# Patient Record
Sex: MALE | Race: WHITE | Employment: UNEMPLOYED | URBAN - METROPOLITAN AREA
[De-identification: names, ages, dates, MRNs, and addresses within clinical notes are randomized per-mention and may not be internally consistent; named-entity substitution may affect disease eponyms.]

---

## 2021-02-22 ENCOUNTER — HOSPITAL ENCOUNTER (EMERGENCY)
Age: 36
Discharge: HOME | End: 2021-02-22
Attending: EMERGENCY MEDICINE

## 2021-02-22 VITALS
WEIGHT: 220 LBS | TEMPERATURE: 98.4 F | BODY MASS INDEX: 29.8 KG/M2 | OXYGEN SATURATION: 99 % | DIASTOLIC BLOOD PRESSURE: 92 MMHG | HEIGHT: 72 IN | RESPIRATION RATE: 16 BRPM | HEART RATE: 86 BPM | SYSTOLIC BLOOD PRESSURE: 139 MMHG

## 2021-02-22 DIAGNOSIS — L02.91 ABSCESS: Primary | ICD-10-CM

## 2021-02-22 PROCEDURE — 10061 I&D ABSCESS COMP/MULTIPLE: CPT

## 2021-02-22 PROCEDURE — 99283 EMERGENCY DEPT VISIT LOW MDM: CPT

## 2021-02-22 PROCEDURE — 2500000003 HC RX 250 WO HCPCS: Performed by: EMERGENCY MEDICINE

## 2021-02-22 RX ORDER — LIDOCAINE HYDROCHLORIDE 10 MG/ML
5 INJECTION, SOLUTION INFILTRATION; PERINEURAL ONCE
Status: COMPLETED | OUTPATIENT
Start: 2021-02-22 | End: 2021-02-22

## 2021-02-22 RX ADMIN — LIDOCAINE HYDROCHLORIDE 5 ML: 10 INJECTION, SOLUTION INFILTRATION; PERINEURAL at 05:49

## 2021-02-22 SDOH — HEALTH STABILITY: MENTAL HEALTH: HOW OFTEN DO YOU HAVE A DRINK CONTAINING ALCOHOL?: NEVER

## 2021-02-22 ASSESSMENT — ENCOUNTER SYMPTOMS
SHORTNESS OF BREATH: 0
COUGH: 0
DIARRHEA: 0
ABDOMINAL PAIN: 0
BACK PAIN: 0
VOMITING: 0

## 2021-02-22 NOTE — ED TRIAGE NOTES
Mode of arrival (squad #, walk in, police, etc) : car         Chief complaint(s): abscess     Arrival Note (brief scenario, treatment PTA, etc). : abscess behind left ear behind mask loop redden swollen           C= \"Have you ever felt that you should Cut down on your drinking? \"  No  A= \"Have people Annoyed you by criticizing your drinking? \"  No  G= \"Have you ever felt bad or Guilty about your drinking? \"  No  E= \"Have you ever had a drink as an Eye-opener first thing in the morning to steady your nerves or to help a hangover? \"  No      Deferred []      Reason for deferring: N/A    *If yes to two or more: probable alcohol abuse. *

## 2021-02-22 NOTE — ED PROVIDER NOTES
EMERGENCY DEPARTMENT ENCOUNTER    Pt Name: Franny Floyd  MRN: [id number]  [place] 1985  Date of evaluation: 2/22/21  CHIEF COMPLAINT       Chief Complaint   Patient presents with    Abscess     HISTORY OF PRESENT ILLNESS   HPI     This is a 59-year-old male who comes in today. About a week ago the patient noticed irritation behind his left ear states that it is causing inner ear problems now. He denies any fevers chills cough congestion no history of similar symptoms. It is during the coronavirus pandemic and he does wear a mask with the loops that go behind his ears. He took ibuprofen earlier tonight without much improvement of his symptoms. The patient states that he has a history of MRSA in his arm but that was years ago. He denies any other complaints at this time. REVIEW OF SYSTEMS     Review of Systems   Constitutional: Negative for fever. HENT: Positive for ear pain. Negative for congestion. Respiratory: Negative for cough and shortness of breath. Cardiovascular: Negative for chest pain. Gastrointestinal: Negative for abdominal pain, diarrhea and vomiting. Genitourinary: Negative for dysuria. Musculoskeletal: Negative for back pain. Skin: Negative for rash. Neurological: Negative for headaches. All other systems reviewed and are negative. PASTMEDICAL HISTORY   History reviewed. No pertinent past medical history. SURGICAL HISTORY     History reviewed. No pertinent surgical history. CURRENT MEDICATIONS       Previous Medications    No medications on file     ALLERGIES     has No Known Allergies. FAMILY HISTORY     has no family status information on file.       SOCIAL HISTORY       Social History     Tobacco Use    Smoking status: Current Every Day Smoker     Packs/day: 0.50     Types: Cigarettes    Smokeless tobacco: Never Used   Substance Use Topics    Alcohol use: Never     Frequency: Never    Drug use: Never     PHYSICAL EXAM INITIAL VITALS: Pulse 86   Resp 16   Ht 6' (1.829 m)   Wt 220 lb (99.8 kg)   SpO2 99%   BMI 29.84 kg/m²    Physical Exam  Vitals signs and nursing note reviewed. Constitutional:       General: He is not in acute distress. Appearance: He is well-developed. HENT:      Head: Normocephalic and atraumatic. Right Ear: Tympanic membrane normal.      Left Ear: Tympanic membrane normal.      Ears:      Comments: Just posterior to the mid left external ear is a 0.5 cm area of fluctuance no surrounding erythema mildly tender to palpation no tenderness to palpation over the mastoid process normal TMs bilaterally  Eyes:      Conjunctiva/sclera: Conjunctivae normal.   Neck:      Musculoskeletal: Neck supple. Cardiovascular:      Rate and Rhythm: Normal rate and regular rhythm. Pulmonary:      Effort: No respiratory distress. Skin:     General: Skin is warm and dry. Capillary Refill: Capillary refill takes less than 2 seconds. Neurological:      Mental Status: He is alert. EMERGENCY DEPARTMENTCOURSE:     Differential diagnosis includes otitis media otitis externa mastoiditis abscess. The patient has a mask where there is an  that can change the length of the ear loops and that  is directly overlying this area of fluctuance I am concerned that this caused a small abscess. Given the location and how close it is to the mastoid process and I&D was performed he has no tenderness over the mastoid process and no fever doubt mastoiditis at this time a small amount of purulence was expressed patient states that he already is feeling better do not believe that he requires antibiotics at this time there is no surrounding erythema no swelling of the external ear or mastoid process.   Patient will be discharged with return instructions             PROCEDURES:    Incision/Drainage    Date/Time: 2/22/2021 6:06 AM  Performed by: Cachroro Rdz MD

## 2023-03-14 ENCOUNTER — OFFICE VISIT (OUTPATIENT)
Dept: PRIMARY CARE | Facility: CLINIC | Age: 38
End: 2023-03-14
Payer: MEDICAID

## 2023-03-14 VITALS
OXYGEN SATURATION: 95 % | WEIGHT: 272 LBS | BODY MASS INDEX: 38.08 KG/M2 | DIASTOLIC BLOOD PRESSURE: 96 MMHG | HEIGHT: 71 IN | SYSTOLIC BLOOD PRESSURE: 134 MMHG | HEART RATE: 109 BPM

## 2023-03-14 DIAGNOSIS — F32.A ANXIETY AND DEPRESSION: Primary | ICD-10-CM

## 2023-03-14 DIAGNOSIS — F51.04 PSYCHOPHYSIOLOGICAL INSOMNIA: ICD-10-CM

## 2023-03-14 DIAGNOSIS — F41.9 ANXIETY AND DEPRESSION: Primary | ICD-10-CM

## 2023-03-14 DIAGNOSIS — R25.1 TREMOR: ICD-10-CM

## 2023-03-14 PROCEDURE — 99204 OFFICE O/P NEW MOD 45 MIN: CPT | Performed by: PHYSICIAN ASSISTANT

## 2023-03-14 PROCEDURE — 4004F PT TOBACCO SCREEN RCVD TLK: CPT | Performed by: PHYSICIAN ASSISTANT

## 2023-03-14 RX ORDER — TRAZODONE HYDROCHLORIDE 50 MG/1
TABLET ORAL
Qty: 30 TABLET | Refills: 5 | Status: SHIPPED | OUTPATIENT
Start: 2023-03-14 | End: 2023-06-27 | Stop reason: ALTCHOICE

## 2023-03-14 RX ORDER — SERTRALINE HYDROCHLORIDE 25 MG/1
25 TABLET, FILM COATED ORAL DAILY
Qty: 30 TABLET | Refills: 5 | Status: SHIPPED | OUTPATIENT
Start: 2023-03-14 | End: 2023-06-27 | Stop reason: ALTCHOICE

## 2023-03-14 ASSESSMENT — PATIENT HEALTH QUESTIONNAIRE - PHQ9
2. FEELING DOWN, DEPRESSED OR HOPELESS: NOT AT ALL
SUM OF ALL RESPONSES TO PHQ9 QUESTIONS 1 AND 2: 0
1. LITTLE INTEREST OR PLEASURE IN DOING THINGS: NOT AT ALL

## 2023-03-14 NOTE — PROGRESS NOTES
"Subjective   Patient ID: Link Emanuel is a 37 y.o. male who presents for [facility] and Tremors (Internal tremors. Worse at HS. Worse when he lays down. ).  HPI    Patient presents to [facility].    Patient has no chronic illnesses and takes no daily medicines.    Patient presents to discuss possible tremors.  Patient reports intermittent tremors that wax and wane without known precipitating event.  Patient states they are most noticeable at night and feel like \"internal tremors\" that can sometimes be felt by the patient's partner.  Patient denies tremor that affects ability to grasp or affects fine motor movement.  Patient reports intermittent associated occipital headaches and paresthesias in the right upper extremity as well.  Patient was seen in the ER for this not long ago and CT brain was unremarkable.  Basic labs were also unremarkable.  Patient states overall, this has been going on for at least a couple months.    With further discussion, the patient does admit to fairly profound anxiety.  Patient states this is most pronounced in social situations even forcing the patient to, for example, leave the store in and near panic attack.  Patient also admits to symptoms consistent with depression including lack of nicholas, lack of interest, general fatigue and apathy, and states both of these have been present for decades.  No prior evaluation or treatment for this.  There is associated insomnia.  Patient has difficulty both falling and staying asleep.    Review of Systems    Review of Systems   Constitutional:  See HPI   Eye:  No recent visual problem.    Respiratory:  No shortness of breath, No cough.    Cardiovascular:  No chest pain.    Gastrointestinal:  No abdominal pain, No nausea, No vomiting.    Genitourinary:  No dysuria, No hematuria.    Musculoskeletal:  No decreased range of motion.    Integumentary:  No rash.    Neurologic: See HPI   Psychiatric: See HPI  all other systems are negative " "    Objective     BP (!) 134/96   Pulse 109   Ht 1.803 m (5' 11\")   Wt 123 kg (272 lb)   SpO2 95%   BMI 37.94 kg/m²     Physical Exam    General:  Alert and oriented, No acute distress.    Eye:  Pupils are equal, round and reactive to light, Extraocular movements are intact, Normal conjunctiva.    HENT:  Normocephalic, Normal hearing, Oral mucosa is moist, No pharyngeal erythema, No sinus tenderness.    Neck:  Supple, Non-tender, No lymphadenopathy.    Respiratory:  Lungs are clear to auscultation, Respirations are non-labored, Breath sounds are equal    Cardiovascular:  Normal rate, Regular rhythm.    Gastrointestinal:  Non-distended.    Musculoskeletal: Normal range of motion, Normal strength, No tenderness, No swelling, No deformity, Normal gait.     Integumentary:  Warm, Dry, Intact, No pallor, No rash.    Neurologic:  Alert, Oriented, Normal sensory, Normal motor function, No focal deficits, Cranial Nerves II-XII are grossly intact   Psychiatric:  Cooperative, Appropriate mood & affect.      Assessment/Plan   Anxiety and depression: Treatment options reviewed.  Start sertraline 25 mg daily.  Follow-up 1 month    Insomnia: Start trazodone 50 mg tab.  One half or 1 tab nightly as needed insomnia    Tremors: None appreciated on exam today.  TSH, A1c, lipids, vitamins D and B, and magnesium labs ordered.  Further recommendations pending results.  Neurology referral ordered  Problem List Items Addressed This Visit    None  Visit Diagnoses       Anxiety and depression    -  Primary    Relevant Medications    sertraline (Zoloft) 25 mg tablet    Psychophysiological insomnia        Relevant Medications    traZODone (Desyrel) 50 mg tablet    Tremor        Relevant Orders    TSH with reflex to Free T4 if abnormal    Hemoglobin A1C    Lipid Panel    Magnesium    Vitamin B12    Vitamin D 1,25 Dihydroxy    Referral to Neurology            Final diagnoses:   [F41.9, F32.9] Anxiety and depression   [F51.04] " Psychophysiological insomnia   [R25.1] Tremor

## 2023-03-14 NOTE — PROGRESS NOTES
Subjective   Patient ID: Link Emanuel is a 37 y.o. male who presents for Establish Care and Tremors (Internal tremors. Worse at HS. Worse when he lays down. ).    HPI     Review of Systems    Objective   There were no vitals taken for this visit.    Physical Exam    Assessment/Plan

## 2023-04-15 ENCOUNTER — LAB (OUTPATIENT)
Dept: LAB | Facility: LAB | Age: 38
End: 2023-04-15
Payer: MEDICAID

## 2023-04-15 DIAGNOSIS — R25.1 TREMOR: ICD-10-CM

## 2023-04-15 LAB
CHOLESTEROL (MG/DL) IN SER/PLAS: 180 MG/DL (ref 0–199)
CHOLESTEROL IN HDL (MG/DL) IN SER/PLAS: 40 MG/DL
CHOLESTEROL/HDL RATIO: 4.5
COBALAMIN (VITAMIN B12) (PG/ML) IN SER/PLAS: 526 PG/ML (ref 211–911)
ESTIMATED AVERAGE GLUCOSE FOR HBA1C: 97 MG/DL
HEMOGLOBIN A1C/HEMOGLOBIN TOTAL IN BLOOD: 5 %
LDL: 117 MG/DL (ref 0–99)
MAGNESIUM (MG/DL) IN SER/PLAS: 2.18 MG/DL (ref 1.6–2.4)
THYROTROPIN (MIU/L) IN SER/PLAS BY DETECTION LIMIT <= 0.05 MIU/L: 1.37 MIU/L (ref 0.44–3.98)
TRIGLYCERIDE (MG/DL) IN SER/PLAS: 114 MG/DL (ref 0–149)
VLDL: 23 MG/DL (ref 0–40)

## 2023-04-15 PROCEDURE — 82652 VIT D 1 25-DIHYDROXY: CPT

## 2023-04-15 PROCEDURE — 84443 ASSAY THYROID STIM HORMONE: CPT

## 2023-04-15 PROCEDURE — 83036 HEMOGLOBIN GLYCOSYLATED A1C: CPT

## 2023-04-15 PROCEDURE — 36415 COLL VENOUS BLD VENIPUNCTURE: CPT

## 2023-04-15 PROCEDURE — 82607 VITAMIN B-12: CPT

## 2023-04-15 PROCEDURE — 80061 LIPID PANEL: CPT

## 2023-04-15 PROCEDURE — 83735 ASSAY OF MAGNESIUM: CPT

## 2023-04-17 ENCOUNTER — APPOINTMENT (OUTPATIENT)
Dept: PRIMARY CARE | Facility: CLINIC | Age: 38
End: 2023-04-17

## 2023-04-21 LAB — VITAMIN D 1,25-DIHYDROXY: 25.9 PG/ML (ref 19.9–79.3)

## 2023-06-27 ENCOUNTER — OFFICE VISIT (OUTPATIENT)
Dept: PRIMARY CARE | Facility: CLINIC | Age: 38
End: 2023-06-27
Payer: COMMERCIAL

## 2023-06-27 VITALS
BODY MASS INDEX: 35.49 KG/M2 | WEIGHT: 262 LBS | HEART RATE: 108 BPM | HEIGHT: 72 IN | DIASTOLIC BLOOD PRESSURE: 98 MMHG | SYSTOLIC BLOOD PRESSURE: 136 MMHG | OXYGEN SATURATION: 94 %

## 2023-06-27 DIAGNOSIS — F51.01 PRIMARY INSOMNIA: ICD-10-CM

## 2023-06-27 DIAGNOSIS — F41.1 GENERALIZED ANXIETY DISORDER: ICD-10-CM

## 2023-06-27 DIAGNOSIS — F51.01 PRIMARY INSOMNIA: Primary | ICD-10-CM

## 2023-06-27 PROBLEM — G47.09 OTHER INSOMNIA: Status: ACTIVE | Noted: 2023-06-27

## 2023-06-27 PROCEDURE — 4004F PT TOBACCO SCREEN RCVD TLK: CPT | Performed by: FAMILY MEDICINE

## 2023-06-27 PROCEDURE — 99214 OFFICE O/P EST MOD 30 MIN: CPT | Performed by: FAMILY MEDICINE

## 2023-06-27 RX ORDER — QUETIAPINE FUMARATE 50 MG/1
50 TABLET, EXTENDED RELEASE ORAL NIGHTLY
Qty: 30 TABLET | Refills: 1 | Status: SHIPPED | OUTPATIENT
Start: 2023-06-27 | End: 2023-06-27 | Stop reason: SDUPTHER

## 2023-06-27 RX ORDER — QUETIAPINE FUMARATE 50 MG/1
50 TABLET, EXTENDED RELEASE ORAL NIGHTLY
Qty: 30 TABLET | Refills: 1 | Status: SHIPPED | OUTPATIENT
Start: 2023-06-27 | End: 2023-07-12 | Stop reason: SDUPTHER

## 2023-06-27 ASSESSMENT — ENCOUNTER SYMPTOMS
DYSPHORIC MOOD: 1
NERVOUS/ANXIOUS: 1
PALPITATIONS: 1
SLEEP DISTURBANCE: 1
CHEST TIGHTNESS: 0
DECREASED CONCENTRATION: 0
HYPERACTIVE: 0
AGITATION: 1
CONFUSION: 0
DIARRHEA: 0
COUGH: 0
SHORTNESS OF BREATH: 0
HEADACHES: 0
CONSTIPATION: 0
ABDOMINAL PAIN: 0
FATIGUE: 1

## 2023-06-27 ASSESSMENT — PATIENT HEALTH QUESTIONNAIRE - PHQ9
SUM OF ALL RESPONSES TO PHQ9 QUESTIONS 1 AND 2: 0
1. LITTLE INTEREST OR PLEASURE IN DOING THINGS: NOT AT ALL
2. FEELING DOWN, DEPRESSED OR HOPELESS: NOT AT ALL

## 2023-06-27 NOTE — PROGRESS NOTES
Subjective   Patient ID: Link Emanuel is a 38 y.o. male who presents for Establish Care.    HPI   Sounds like he has some significant anxiety, but he also has racing thoughts at night trying to fall asleep and when he wakes in the night back to sleep racing thoughts.  His racing thoughts started just after high school.  Also does have some anxiety with occasional panic attack.  Those are not necessarily getting worse, but he does feel the anxiety is getting worse.  Sister possibly has bipolar.  He did try trazodone at night to help with sleep and even 2 pills did not help.  Zoloft caused a very bad reaction and only took a couple of pills.    I do think he has anxiety with panic attacks, but I think there is a possibility of bipolar disorder also.  Since he failed Zoloft, neck step usually is Prozac.  Lets try long-acting Seroquel.    Quetiapine ER 50 mg 1 or 2 pills in the evening.  Call next week with an update on how he is doing.  No office visit set yet.    Review of Systems   Constitutional:  Positive for fatigue.   Eyes:  Negative for visual disturbance.   Respiratory:  Negative for cough, chest tightness and shortness of breath.    Cardiovascular:  Positive for chest pain and palpitations.   Gastrointestinal:  Negative for abdominal pain, constipation and diarrhea.   Skin:  Negative for rash.   Neurological:  Negative for headaches.   Psychiatric/Behavioral:  Positive for agitation, behavioral problems, dysphoric mood and sleep disturbance. Negative for confusion and decreased concentration. The patient is nervous/anxious. The patient is not hyperactive.        Objective   BP (!) 136/98   Pulse 108   Ht 1.829 m (6')   Wt 119 kg (262 lb)   SpO2 94%   BMI 35.53 kg/m²     Physical Exam  Constitutional:       Appearance: Normal appearance.   HENT:      Head: Normocephalic and atraumatic.   Neck:      Thyroid: No thyromegaly.   Cardiovascular:      Rate and Rhythm: Normal rate and regular rhythm.      Heart  sounds: Normal heart sounds.   Pulmonary:      Effort: Pulmonary effort is normal.      Breath sounds: Normal breath sounds.   Skin:     General: Skin is warm and dry.   Neurological:      General: No focal deficit present.      Mental Status: He is alert and oriented to person, place, and time.      Gait: Gait normal.   Psychiatric:         Mood and Affect: Mood normal.         Behavior: Behavior normal.         Thought Content: Thought content normal.         Judgment: Judgment normal.         Assessment/Plan   Problem List Items Addressed This Visit       Generalized anxiety disorder    Relevant Medications    QUEtiapine XR (SEROquel XR) 50 mg 24 hr tablet    Primary insomnia - Primary    Relevant Medications    QUEtiapine XR (SEROquel XR) 50 mg 24 hr tablet

## 2023-07-12 ENCOUNTER — TELEPHONE (OUTPATIENT)
Dept: PRIMARY CARE | Facility: CLINIC | Age: 38
End: 2023-07-12
Payer: COMMERCIAL

## 2023-07-12 DIAGNOSIS — F51.01 PRIMARY INSOMNIA: ICD-10-CM

## 2023-07-12 DIAGNOSIS — F41.1 GENERALIZED ANXIETY DISORDER: ICD-10-CM

## 2023-07-12 RX ORDER — QUETIAPINE FUMARATE 50 MG/1
100 TABLET, EXTENDED RELEASE ORAL NIGHTLY
Qty: 60 TABLET | Refills: 1 | Status: SHIPPED | OUTPATIENT
Start: 2023-07-12 | End: 2023-07-20 | Stop reason: SDUPTHER

## 2023-07-12 RX ORDER — FLUOXETINE 10 MG/1
10 CAPSULE ORAL DAILY
Qty: 30 CAPSULE | Refills: 1 | Status: SHIPPED | OUTPATIENT
Start: 2023-07-12 | End: 2023-08-08 | Stop reason: SDUPTHER

## 2023-07-12 NOTE — TELEPHONE ENCOUNTER
STARTED SEROQUEL  50MG  WKS AGO AND TOLD TO CALL WITH  UPDATE.   NOT MUCH  HELP.   NOT SLEEPING OR FOCUSING,MOODS ARE OFF.   WHEN HE 1ST STARTED NOTICED A DIFFERENCE, BUT NOT NOW.

## 2023-07-13 ENCOUNTER — TELEPHONE (OUTPATIENT)
Dept: PRIMARY CARE | Facility: CLINIC | Age: 38
End: 2023-07-13
Payer: COMMERCIAL

## 2023-07-13 NOTE — TELEPHONE ENCOUNTER
MD Baltazar Browning MA  Caller: Unspecified (Yesterday,  9:54 AM)  Increase the Seroquel to 2 pills at night.  New prescription was done.    Also a new prescription was started for fluoxetine 10 mg a day.    PATIENT NOTIFIED. VERBALIZED UNDERSTANDING

## 2023-07-20 DIAGNOSIS — F51.01 PRIMARY INSOMNIA: ICD-10-CM

## 2023-07-20 DIAGNOSIS — F41.1 GENERALIZED ANXIETY DISORDER: ICD-10-CM

## 2023-07-20 RX ORDER — QUETIAPINE FUMARATE 50 MG/1
100 TABLET, EXTENDED RELEASE ORAL NIGHTLY
Qty: 60 TABLET | Refills: 1 | Status: SHIPPED | OUTPATIENT
Start: 2023-07-20 | End: 2023-08-08 | Stop reason: SDUPTHER

## 2023-08-08 ENCOUNTER — OFFICE VISIT (OUTPATIENT)
Dept: PRIMARY CARE | Facility: CLINIC | Age: 38
End: 2023-08-08
Payer: COMMERCIAL

## 2023-08-08 ENCOUNTER — TELEPHONE (OUTPATIENT)
Dept: PRIMARY CARE | Facility: CLINIC | Age: 38
End: 2023-08-08

## 2023-08-08 VITALS
HEIGHT: 72 IN | DIASTOLIC BLOOD PRESSURE: 102 MMHG | SYSTOLIC BLOOD PRESSURE: 118 MMHG | WEIGHT: 255 LBS | OXYGEN SATURATION: 96 % | BODY MASS INDEX: 34.54 KG/M2 | HEART RATE: 90 BPM

## 2023-08-08 DIAGNOSIS — F51.01 PRIMARY INSOMNIA: ICD-10-CM

## 2023-08-08 DIAGNOSIS — F41.1 GENERALIZED ANXIETY DISORDER: ICD-10-CM

## 2023-08-08 PROCEDURE — 4004F PT TOBACCO SCREEN RCVD TLK: CPT | Performed by: FAMILY MEDICINE

## 2023-08-08 PROCEDURE — 99213 OFFICE O/P EST LOW 20 MIN: CPT | Performed by: FAMILY MEDICINE

## 2023-08-08 RX ORDER — FLUOXETINE HYDROCHLORIDE 20 MG/1
20 CAPSULE ORAL DAILY
Qty: 30 CAPSULE | Refills: 1 | Status: SHIPPED | OUTPATIENT
Start: 2023-08-08 | End: 2023-10-09 | Stop reason: SDUPTHER

## 2023-08-08 RX ORDER — TERBINAFINE HYDROCHLORIDE 250 MG/1
TABLET ORAL
COMMUNITY
Start: 2023-07-31

## 2023-08-08 RX ORDER — AZITHROMYCIN 500 MG/1
TABLET, FILM COATED ORAL
COMMUNITY
Start: 2023-05-02 | End: 2024-03-08 | Stop reason: ALTCHOICE

## 2023-08-08 RX ORDER — CLOTRIMAZOLE AND BETAMETHASONE DIPROPIONATE 10; .64 MG/G; MG/G
CREAM TOPICAL
COMMUNITY
Start: 2023-07-31 | End: 2024-03-08 | Stop reason: ALTCHOICE

## 2023-08-08 RX ORDER — QUETIAPINE 200 MG/1
200 TABLET, FILM COATED, EXTENDED RELEASE ORAL NIGHTLY
Qty: 30 TABLET | Refills: 1 | Status: SHIPPED | OUTPATIENT
Start: 2023-08-08 | End: 2024-05-24 | Stop reason: SDUPTHER

## 2023-08-08 ASSESSMENT — PATIENT HEALTH QUESTIONNAIRE - PHQ9
2. FEELING DOWN, DEPRESSED OR HOPELESS: NEARLY EVERY DAY
8. MOVING OR SPEAKING SO SLOWLY THAT OTHER PEOPLE COULD HAVE NOTICED. OR THE OPPOSITE, BEING SO FIGETY OR RESTLESS THAT YOU HAVE BEEN MOVING AROUND A LOT MORE THAN USUAL: NOT AT ALL
3. TROUBLE FALLING OR STAYING ASLEEP OR SLEEPING TOO MUCH: NEARLY EVERY DAY
5. POOR APPETITE OR OVEREATING: NOT AT ALL
7. TROUBLE CONCENTRATING ON THINGS, SUCH AS READING THE NEWSPAPER OR WATCHING TELEVISION: SEVERAL DAYS
9. THOUGHTS THAT YOU WOULD BE BETTER OFF DEAD, OR OF HURTING YOURSELF: NOT AT ALL
10. IF YOU CHECKED OFF ANY PROBLEMS, HOW DIFFICULT HAVE THESE PROBLEMS MADE IT FOR YOU TO DO YOUR WORK, TAKE CARE OF THINGS AT HOME, OR GET ALONG WITH OTHER PEOPLE: SOMEWHAT DIFFICULT
4. FEELING TIRED OR HAVING LITTLE ENERGY: NOT AT ALL
6. FEELING BAD ABOUT YOURSELF - OR THAT YOU ARE A FAILURE OR HAVE LET YOURSELF OR YOUR FAMILY DOWN: SEVERAL DAYS
1. LITTLE INTEREST OR PLEASURE IN DOING THINGS: NEARLY EVERY DAY
SUM OF ALL RESPONSES TO PHQ9 QUESTIONS 1 AND 2: 6
SUM OF ALL RESPONSES TO PHQ QUESTIONS 1-9: 11

## 2023-08-08 ASSESSMENT — ENCOUNTER SYMPTOMS
FATIGUE: 1
AGITATION: 1
NERVOUS/ANXIOUS: 1
DECREASED CONCENTRATION: 1
SLEEP DISTURBANCE: 1

## 2023-08-08 NOTE — PROGRESS NOTES
Subjective   Patient ID: Link Emanuel is a 38 y.o. male who presents for Anxiety.    HPI   Not a big change with the Prozac so far, but no bad reaction like he had with the Zoloft.  Think that is because he is on a mood stabilizer at this point.  Increase Prozac to 20 mg daily.  Very good reaction with less anxiety better sleep less racing thoughts on the 50 of the long-acting Seroquel.  That started to wear off no recent increase in the 100 he felt like he was almost back to square 1.  No other troubles with the medicine.    Increase Seroquel to 200 mg daily.  He does have the 50s so he is can take 150 for the next 2 nights.  ?  Add Lamictal also.  Will refer to  psychiatry.    Review of Systems   Constitutional:  Positive for fatigue.   Psychiatric/Behavioral:  Positive for agitation, decreased concentration and sleep disturbance. Negative for suicidal ideas. The patient is nervous/anxious.        Objective   BP (!) 118/102   Pulse 90   Ht 1.829 m (6')   Wt 116 kg (255 lb)   SpO2 96%   BMI 34.58 kg/m²     Physical Exam  Constitutional:       Appearance: Normal appearance.   Skin:     General: Skin is warm and dry.   Neurological:      General: No focal deficit present.      Mental Status: He is alert and oriented to person, place, and time.   Psychiatric:         Mood and Affect: Mood normal.         Behavior: Behavior normal.         Judgment: Judgment normal.         Assessment/Plan   Problem List Items Addressed This Visit       Generalized anxiety disorder    Relevant Medications    FLUoxetine (PROzac) 20 mg capsule    QUEtiapine XR (SEROquel XR) 200 mg 24 hr tablet    Other Relevant Orders    Referral to Psychiatry    Primary insomnia    Relevant Medications    FLUoxetine (PROzac) 20 mg capsule    QUEtiapine XR (SEROquel XR) 200 mg 24 hr tablet    Other Relevant Orders    Referral to Psychiatry

## 2023-09-30 ENCOUNTER — HOSPITAL ENCOUNTER (EMERGENCY)
Facility: HOSPITAL | Age: 38
Discharge: STILL ADMITTED OR ADMINISTRATIVE | End: 2023-09-30
Attending: NURSE PRACTITIONER | Admitting: NURSE PRACTITIONER
Payer: COMMERCIAL

## 2023-09-30 PROCEDURE — 99281 EMR DPT VST MAYX REQ PHY/QHP: CPT | Performed by: NURSE PRACTITIONER

## 2023-09-30 PROCEDURE — 4500999001 HC ED NO CHARGE

## 2023-10-09 DIAGNOSIS — F41.1 GENERALIZED ANXIETY DISORDER: Primary | ICD-10-CM

## 2023-10-09 DIAGNOSIS — F51.01 PRIMARY INSOMNIA: ICD-10-CM

## 2023-10-09 RX ORDER — FLUOXETINE 10 MG/1
10 CAPSULE ORAL DAILY
COMMUNITY
End: 2023-10-09 | Stop reason: SDUPTHER

## 2023-10-09 RX ORDER — FLUOXETINE 10 MG/1
10 CAPSULE ORAL DAILY
Qty: 30 CAPSULE | Refills: 11 | Status: SHIPPED | OUTPATIENT
Start: 2023-10-09 | End: 2024-03-08 | Stop reason: ALTCHOICE

## 2023-10-09 RX ORDER — FLUOXETINE HYDROCHLORIDE 20 MG/1
20 CAPSULE ORAL DAILY
Qty: 30 CAPSULE | Refills: 11 | Status: SHIPPED | OUTPATIENT
Start: 2023-10-09 | End: 2024-03-08 | Stop reason: ALTCHOICE

## 2024-01-22 ENCOUNTER — HOSPITAL ENCOUNTER (EMERGENCY)
Facility: HOSPITAL | Age: 39
Discharge: HOME | End: 2024-01-22
Attending: EMERGENCY MEDICINE
Payer: COMMERCIAL

## 2024-01-22 VITALS
BODY MASS INDEX: 35.21 KG/M2 | RESPIRATION RATE: 16 BRPM | WEIGHT: 260 LBS | OXYGEN SATURATION: 98 % | HEIGHT: 72 IN | DIASTOLIC BLOOD PRESSURE: 84 MMHG | SYSTOLIC BLOOD PRESSURE: 130 MMHG | HEART RATE: 81 BPM
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
## 2024-01-22 DIAGNOSIS — S61.451A CAT BITE OF HAND, RIGHT, INITIAL ENCOUNTER: Primary | ICD-10-CM

## 2024-01-22 PROCEDURE — 2500000004 HC RX 250 GENERAL PHARMACY W/ HCPCS (ALT 636 FOR OP/ED): Performed by: EMERGENCY MEDICINE

## 2024-01-22 PROCEDURE — 2500000001 HC RX 250 WO HCPCS SELF ADMINISTERED DRUGS (ALT 637 FOR MEDICARE OP): Performed by: EMERGENCY MEDICINE

## 2024-01-22 PROCEDURE — 99284 EMERGENCY DEPT VISIT MOD MDM: CPT | Performed by: EMERGENCY MEDICINE

## 2024-01-22 PROCEDURE — 90471 IMMUNIZATION ADMIN: CPT | Performed by: EMERGENCY MEDICINE

## 2024-01-22 PROCEDURE — 90715 TDAP VACCINE 7 YRS/> IM: CPT | Performed by: EMERGENCY MEDICINE

## 2024-01-22 PROCEDURE — 99283 EMERGENCY DEPT VISIT LOW MDM: CPT

## 2024-01-22 RX ORDER — AMOXICILLIN AND CLAVULANATE POTASSIUM 875; 125 MG/1; MG/1
1 TABLET, FILM COATED ORAL EVERY 12 HOURS
Qty: 20 TABLET | Refills: 0 | Status: SHIPPED | OUTPATIENT
Start: 2024-01-22 | End: 2024-01-22 | Stop reason: SDUPTHER

## 2024-01-22 RX ORDER — AMOXICILLIN AND CLAVULANATE POTASSIUM 875; 125 MG/1; MG/1
1 TABLET, FILM COATED ORAL ONCE
Status: COMPLETED | OUTPATIENT
Start: 2024-01-22 | End: 2024-01-22

## 2024-01-22 RX ORDER — AMOXICILLIN AND CLAVULANATE POTASSIUM 875; 125 MG/1; MG/1
1 TABLET, FILM COATED ORAL EVERY 12 HOURS
Qty: 20 TABLET | Refills: 0 | Status: SHIPPED | OUTPATIENT
Start: 2024-01-22 | End: 2024-02-01

## 2024-01-22 RX ADMIN — TETANUS TOXOID, REDUCED DIPHTHERIA TOXOID AND ACELLULAR PERTUSSIS VACCINE, ADSORBED 0.5 ML: 5; 2.5; 8; 8; 2.5 SUSPENSION INTRAMUSCULAR at 21:12

## 2024-01-22 RX ADMIN — AMOXICILLIN AND CLAVULANATE POTASSIUM 875 MG: 875; 125 TABLET, FILM COATED ORAL at 21:12

## 2024-01-22 ASSESSMENT — COLUMBIA-SUICIDE SEVERITY RATING SCALE - C-SSRS
1. IN THE PAST MONTH, HAVE YOU WISHED YOU WERE DEAD OR WISHED YOU COULD GO TO SLEEP AND NOT WAKE UP?: NO
6. HAVE YOU EVER DONE ANYTHING, STARTED TO DO ANYTHING, OR PREPARED TO DO ANYTHING TO END YOUR LIFE?: NO
2. HAVE YOU ACTUALLY HAD ANY THOUGHTS OF KILLING YOURSELF?: NO

## 2024-01-22 ASSESSMENT — PAIN SCALES - GENERAL: PAINLEVEL_OUTOF10: 3

## 2024-01-22 ASSESSMENT — PAIN DESCRIPTION - DESCRIPTORS: DESCRIPTORS: BURNING

## 2024-01-22 ASSESSMENT — PAIN DESCRIPTION - FREQUENCY: FREQUENCY: CONSTANT/CONTINUOUS

## 2024-01-22 ASSESSMENT — PAIN - FUNCTIONAL ASSESSMENT: PAIN_FUNCTIONAL_ASSESSMENT: 0-10

## 2024-01-22 ASSESSMENT — PAIN DESCRIPTION - LOCATION: LOCATION: HAND

## 2024-01-22 ASSESSMENT — PAIN DESCRIPTION - PAIN TYPE: TYPE: ACUTE PAIN

## 2024-01-23 NOTE — ED PROVIDER NOTES
Cat bite to right hand.  This 38-year-old white male presents to the ED secondary to a cat bite to his right hand he states he is left-hand-dominant.  He is at the cat is not aggressive at all but tonight for some reason it bit his right hand.  The patient states that the cat has been hanging around his home for quite some time now.  He denies any foreign body sensation to the right hand.  Denies any loss of function or weakness of his right hand.  Last tetanus is unknown.  Denies any allergies to any medications.  He states that nothing makes his symptoms better or worse.      History provided by:  Patient   used: No         Physical Exam  Vitals and nursing note reviewed.   Constitutional:       General: He is awake.      Appearance: Normal appearance. He is overweight.   HENT:      Head: Normocephalic and atraumatic.      Right Ear: Hearing normal.      Left Ear: Hearing normal.      Nose: Nose normal. No congestion or rhinorrhea.      Mouth/Throat:      Mouth: Mucous membranes are moist.      Pharynx: Oropharynx is clear. No oropharyngeal exudate or posterior oropharyngeal erythema.   Eyes:      General: Lids are normal.         Right eye: No discharge.         Left eye: No discharge.      Extraocular Movements: Extraocular movements intact.      Conjunctiva/sclera: Conjunctivae normal.      Pupils: Pupils are equal, round, and reactive to light.   Cardiovascular:      Rate and Rhythm: Normal rate and regular rhythm.      Pulses: Normal pulses.      Heart sounds: Normal heart sounds. No murmur heard.     No friction rub. No gallop.   Pulmonary:      Effort: Pulmonary effort is normal. No respiratory distress.      Breath sounds: Normal breath sounds. No stridor. No wheezing, rhonchi or rales.   Chest:      Chest wall: No tenderness.   Abdominal:      General: Abdomen is flat. Bowel sounds are normal. There is no distension.      Palpations: Abdomen is soft. There is no mass.      Tenderness:  There is no abdominal tenderness. There is no guarding or rebound.      Hernia: No hernia is present.   Musculoskeletal:         General: No swelling, tenderness, deformity or signs of injury. Normal range of motion.      Left hand: Normal.        Hands:       Cervical back: Full passive range of motion without pain, normal range of motion and neck supple.      Right lower leg: No edema.      Left lower leg: No edema.      Comments: Patient has 3 distinct puncture wounds noted on the palmar surface of the right hand.  No palpable foreign bodies visualized.  No active bleeding noted.  No soft tissue swelling or erythema or increase in temperature.   Skin:     General: Skin is warm and dry.      Capillary Refill: Capillary refill takes less than 2 seconds.      Coloration: Skin is not jaundiced or pale.      Findings: No bruising, erythema, lesion or rash.   Neurological:      General: No focal deficit present.      Mental Status: He is alert and oriented to person, place, and time.      Cranial Nerves: No cranial nerve deficit.      Sensory: No sensory deficit.      Motor: No weakness.      Coordination: Coordination normal.      Deep Tendon Reflexes: Reflexes normal.   Psychiatric:         Mood and Affect: Mood normal.         Behavior: Behavior normal. Behavior is cooperative.         Thought Content: Thought content normal.         Judgment: Judgment normal.          Labs Reviewed - No data to display     No orders to display        Procedures     Medical Decision Making  Patient was seen and evaluated due to a cat bite to the palmar aspect of his right hand.  He states that his last tetanus is unknown and he is not allergic to penicillin.  He denies any foreign body sensation of his hand and states the cat is a wild cat though has been rather friendly until this evening.  Based on this history I do not feel the patient is at risk for exposure to rabies and the cat is still around his home and can be accessed by  animal control if needed.  The patient's tetanus was updated he was treated with a dose of Augmentin prior to being discharged home with a prescription for Augmentin.  He was discharged home in stable satisfactory condition.         Diagnoses as of 01/23/24 0447   Cat bite of hand, right, initial encounter                    Jose Eduardo Carrera, DO  01/23/24 0447

## 2024-02-11 ENCOUNTER — HOSPITAL ENCOUNTER (EMERGENCY)
Facility: HOSPITAL | Age: 39
Discharge: HOME | End: 2024-02-12
Attending: EMERGENCY MEDICINE
Payer: COMMERCIAL

## 2024-02-11 ENCOUNTER — APPOINTMENT (OUTPATIENT)
Dept: RADIOLOGY | Facility: HOSPITAL | Age: 39
End: 2024-02-11
Payer: COMMERCIAL

## 2024-02-11 VITALS
HEART RATE: 104 BPM | SYSTOLIC BLOOD PRESSURE: 119 MMHG | TEMPERATURE: 99.4 F | OXYGEN SATURATION: 96 % | DIASTOLIC BLOOD PRESSURE: 83 MMHG | WEIGHT: 260 LBS | RESPIRATION RATE: 18 BRPM | BODY MASS INDEX: 35.21 KG/M2 | HEIGHT: 72 IN

## 2024-02-11 DIAGNOSIS — M54.9 OTHER CHRONIC BACK PAIN: Primary | ICD-10-CM

## 2024-02-11 DIAGNOSIS — G89.29 OTHER CHRONIC BACK PAIN: Primary | ICD-10-CM

## 2024-02-11 PROCEDURE — 99283 EMERGENCY DEPT VISIT LOW MDM: CPT

## 2024-02-11 PROCEDURE — 72100 X-RAY EXAM L-S SPINE 2/3 VWS: CPT | Performed by: STUDENT IN AN ORGANIZED HEALTH CARE EDUCATION/TRAINING PROGRAM

## 2024-02-11 PROCEDURE — 96372 THER/PROPH/DIAG INJ SC/IM: CPT

## 2024-02-11 PROCEDURE — 2500000004 HC RX 250 GENERAL PHARMACY W/ HCPCS (ALT 636 FOR OP/ED): Performed by: EMERGENCY MEDICINE

## 2024-02-11 PROCEDURE — 72100 X-RAY EXAM L-S SPINE 2/3 VWS: CPT

## 2024-02-11 RX ORDER — KETOROLAC TROMETHAMINE 30 MG/ML
15 INJECTION, SOLUTION INTRAMUSCULAR; INTRAVENOUS ONCE
Status: COMPLETED | OUTPATIENT
Start: 2024-02-11 | End: 2024-02-11

## 2024-02-11 RX ORDER — ETODOLAC 400 MG/1
400 TABLET, FILM COATED ORAL 2 TIMES DAILY
Qty: 14 TABLET | Refills: 0 | Status: SHIPPED | OUTPATIENT
Start: 2024-02-11 | End: 2024-02-18

## 2024-02-11 RX ADMIN — KETOROLAC TROMETHAMINE 15 MG: 30 INJECTION, SOLUTION INTRAMUSCULAR; INTRAVENOUS at 23:28

## 2024-02-11 ASSESSMENT — PAIN SCALES - GENERAL
PAINLEVEL_OUTOF10: 8
PAINLEVEL_OUTOF10: 8

## 2024-02-11 ASSESSMENT — PAIN - FUNCTIONAL ASSESSMENT
PAIN_FUNCTIONAL_ASSESSMENT: 0-10
PAIN_FUNCTIONAL_ASSESSMENT: 0-10

## 2024-02-11 ASSESSMENT — PAIN DESCRIPTION - LOCATION
LOCATION: BACK
LOCATION: BACK

## 2024-02-11 ASSESSMENT — PAIN DESCRIPTION - PAIN TYPE
TYPE: ACUTE PAIN
TYPE: ACUTE PAIN

## 2024-02-11 ASSESSMENT — PAIN DESCRIPTION - DESCRIPTORS
DESCRIPTORS: BURNING
DESCRIPTORS: BURNING

## 2024-02-11 ASSESSMENT — PAIN DESCRIPTION - ONSET: ONSET: GRADUAL

## 2024-02-11 ASSESSMENT — PAIN DESCRIPTION - ORIENTATION
ORIENTATION: RIGHT;LEFT;LOWER
ORIENTATION: LOWER;RIGHT;LEFT

## 2024-02-11 ASSESSMENT — PAIN DESCRIPTION - FREQUENCY: FREQUENCY: CONSTANT/CONTINUOUS

## 2024-02-11 NOTE — Clinical Note
Link Emanuel was seen and treated in our emergency department on 2/11/2024.  He may return to work on 02/14/2024.       If you have any questions or concerns, please don't hesitate to call.      Hugo Singer, DO

## 2024-02-12 NOTE — ED PROVIDER NOTES
HPI   Chief Complaint   Patient presents with    Back Pain     Pt comes in for lower back pain x 1 wk. Pt states he has bilateral lower back pain that hurts no matter positioning or weight. Pt denies any urinary issues, bm issues, injury or trauma.        Patient presents to the emergency department secondary to lower back pain.  He states that this has been present for about 1 week.  No direct trauma.  He states that he has a  son and he is bending over quite a bit to change diapers and care for his son and he believes this is aggravated his back.  He denies abdominal pain or dysuria.  Describes his pain as aching and generalized over the lumbar spine.      History provided by:  Patient   used: No                        No data recorded                   Patient History   Past Medical History:   Diagnosis Date    PTSD (post-traumatic stress disorder)      Past Surgical History:   Procedure Laterality Date    NO PAST SURGERIES       Family History   Problem Relation Name Age of Onset    Heart attack Mother          Passed away at 36.     Social History     Tobacco Use    Smoking status: Former     Types: Cigarettes    Smokeless tobacco: Never   Vaping Use    Vaping Use: Every day    Substances: Nicotine   Substance Use Topics    Alcohol use: Never    Drug use: Never       Physical Exam   ED Triage Vitals [24 2236]   Temperature Heart Rate Respirations BP   37.4 °C (99.4 °F) (!) 104 18 119/83      Pulse Ox Temp Source Heart Rate Source Patient Position   96 % Oral Monitor Sitting      BP Location FiO2 (%)     Left arm --       Physical Exam  Vitals and nursing note reviewed.   Constitutional:       General: He is not in acute distress.     Appearance: Normal appearance. He is obese. He is not ill-appearing, toxic-appearing or diaphoretic.   HENT:      Head: Normocephalic and atraumatic.      Nose: Nose normal. No rhinorrhea.   Neck:      Comments: Trachea is midline  Cardiovascular:       Rate and Rhythm: Normal rate and regular rhythm.      Heart sounds: No murmur heard.  Pulmonary:      Effort: Pulmonary effort is normal.      Breath sounds: Normal breath sounds. No wheezing.   Abdominal:      General: Abdomen is flat. Bowel sounds are normal. There is no distension.      Palpations: Abdomen is soft.      Tenderness: There is no abdominal tenderness.   Musculoskeletal:         General: Tenderness present. Normal range of motion.      Cervical back: Normal range of motion.      Comments: Patient has generalized tenderness to palpation to the paraspinal musculature of the lumbar spine without midline tenderness.  No flank percussion tenderness is noted.   Skin:     General: Skin is warm and dry.      Findings: No rash.   Neurological:      General: No focal deficit present.      Mental Status: He is alert and oriented to person, place, and time. Mental status is at baseline.      Comments: No saddle anesthesia.  Full sensation over the bilateral lower extremity dermatomes.   Psychiatric:         Mood and Affect: Mood normal.         Behavior: Behavior normal.         Thought Content: Thought content normal.         Judgment: Judgment normal.         ED Course & MDM   Diagnoses as of 02/11/24 2340   Other chronic back pain       Medical Decision Making  X-ray is unremarkable.  Patient was asking for a work note for 2 days as he starts a new job tomorrow and this was furnished.  Patient will be prescribed NSAIDs and instructed to follow-up with his private physician.  Limit activity as tolerated and return at anytime if worse.        Procedure  Procedures     Hugo Singer,   02/11/24 2347

## 2024-03-08 ENCOUNTER — OFFICE VISIT (OUTPATIENT)
Dept: PRIMARY CARE | Facility: CLINIC | Age: 39
End: 2024-03-08
Payer: COMMERCIAL

## 2024-03-08 VITALS
HEIGHT: 72 IN | WEIGHT: 260 LBS | HEART RATE: 81 BPM | DIASTOLIC BLOOD PRESSURE: 68 MMHG | OXYGEN SATURATION: 98 % | SYSTOLIC BLOOD PRESSURE: 124 MMHG | BODY MASS INDEX: 35.21 KG/M2

## 2024-03-08 DIAGNOSIS — M54.50 ACUTE MIDLINE LOW BACK PAIN WITHOUT SCIATICA: Primary | ICD-10-CM

## 2024-03-08 PROCEDURE — 99213 OFFICE O/P EST LOW 20 MIN: CPT | Performed by: FAMILY MEDICINE

## 2024-03-08 RX ORDER — PREDNISONE 10 MG/1
TABLET ORAL
Qty: 30 TABLET | Refills: 1 | Status: SHIPPED | OUTPATIENT
Start: 2024-03-08 | End: 2024-03-19

## 2024-03-08 RX ORDER — FLUOXETINE HYDROCHLORIDE 20 MG/1
20 CAPSULE ORAL DAILY
Qty: 30 CAPSULE | Refills: 11
Start: 2024-03-08 | End: 2024-03-08 | Stop reason: DRUGHIGH

## 2024-03-08 RX ORDER — CYCLOBENZAPRINE HCL 10 MG
10 TABLET ORAL NIGHTLY PRN
Qty: 30 TABLET | Refills: 2 | Status: SHIPPED | OUTPATIENT
Start: 2024-03-08 | End: 2024-11-03

## 2024-03-08 RX ORDER — FLUOXETINE HYDROCHLORIDE 40 MG/1
40 CAPSULE ORAL DAILY
Qty: 30 CAPSULE | Refills: 11 | Status: SHIPPED
Start: 2024-03-08 | End: 2024-05-24 | Stop reason: SDUPTHER

## 2024-03-08 ASSESSMENT — ENCOUNTER SYMPTOMS
SLEEP DISTURBANCE: 1
NUMBNESS: 0
BACK PAIN: 1

## 2024-03-08 ASSESSMENT — PATIENT HEALTH QUESTIONNAIRE - PHQ9
2. FEELING DOWN, DEPRESSED OR HOPELESS: NOT AT ALL
1. LITTLE INTEREST OR PLEASURE IN DOING THINGS: NOT AT ALL
SUM OF ALL RESPONSES TO PHQ9 QUESTIONS 1 AND 2: 0

## 2024-03-08 NOTE — PROGRESS NOTES
Subjective   Patient ID: Link Emanuel is a 38 y.o. male who presents for Back Pain (Middle to lower back pain x 1 mo. ).    HPI   Low backpain x 1 month.  Getting worse.  NKI or overuse.  Same type but wiorse with pain in past.  Motrin not [id number]  Sleep is problem.    On exam he has some mild tenderness depression over the LS spine and bilateral paravertebral muscles.  Normal gait.    Prednisone taper  Flexeril as needed at night.  His young son just came home from the NICU, so I told him he needs a backup plan to taking the medicine at night because he could not get him out and he will wake up easily.  If he still having troubles in a couple weeks, I am okay to see the chiropractor or we can refer him to see physical therapy.    Review of Systems   Musculoskeletal:  Positive for back pain. Negative for gait problem.   Skin:  Negative for rash.   Neurological:  Negative for numbness.   Psychiatric/Behavioral:  Positive for sleep disturbance.        Objective   /68   Pulse 81   Ht 1.829 m (6')   Wt 118 kg (260 lb)   SpO2 98%   BMI 35.26 kg/m²     Physical Exam  Constitutional:       Appearance: Normal appearance.   Skin:     General: Skin is warm and dry.   Neurological:      General: No focal deficit present.      Mental Status: He is alert and oriented to person, place, and time.   Psychiatric:         Mood and Affect: Mood normal.         Behavior: Behavior normal.         Judgment: Judgment normal.         Assessment/Plan   Problem List Items Addressed This Visit    None  Visit Diagnoses         Codes    Acute midline low back pain without sciatica    -  Primary M54.50    Relevant Medications    FLUoxetine (PROzac) 40 mg capsule    predniSONE (Deltasone) 10 mg tablet    cyclobenzaprine (Flexeril) 10 mg tablet

## 2024-05-24 DIAGNOSIS — F51.01 PRIMARY INSOMNIA: ICD-10-CM

## 2024-05-24 DIAGNOSIS — F41.1 GENERALIZED ANXIETY DISORDER: ICD-10-CM

## 2024-05-24 DIAGNOSIS — M54.50 ACUTE MIDLINE LOW BACK PAIN WITHOUT SCIATICA: ICD-10-CM

## 2024-05-24 RX ORDER — QUETIAPINE 200 MG/1
200 TABLET, FILM COATED, EXTENDED RELEASE ORAL NIGHTLY
Qty: 30 TABLET | Refills: 1 | Status: SHIPPED | OUTPATIENT
Start: 2024-05-24 | End: 2024-07-23

## 2024-05-24 RX ORDER — FLUOXETINE HYDROCHLORIDE 40 MG/1
40 CAPSULE ORAL DAILY
Qty: 30 CAPSULE | Refills: 11 | Status: SHIPPED | OUTPATIENT
Start: 2024-05-24 | End: 2025-05-24

## 2024-06-21 ENCOUNTER — APPOINTMENT (OUTPATIENT)
Dept: PRIMARY CARE | Facility: CLINIC | Age: 39
End: 2024-06-21
Payer: COMMERCIAL

## 2024-07-25 DIAGNOSIS — F41.1 GENERALIZED ANXIETY DISORDER: ICD-10-CM

## 2024-07-25 DIAGNOSIS — F51.01 PRIMARY INSOMNIA: ICD-10-CM

## 2024-07-29 RX ORDER — QUETIAPINE 200 MG/1
200 TABLET, FILM COATED, EXTENDED RELEASE ORAL NIGHTLY
Qty: 30 TABLET | Refills: 1 | Status: SHIPPED | OUTPATIENT
Start: 2024-07-29 | End: 2024-09-27

## 2024-08-26 ENCOUNTER — APPOINTMENT (OUTPATIENT)
Age: 39
End: 2024-08-26
Payer: COMMERCIAL

## 2024-09-13 ENCOUNTER — APPOINTMENT (OUTPATIENT)
Age: 39
End: 2024-09-13
Payer: COMMERCIAL

## 2024-09-13 VITALS
WEIGHT: 260 LBS | HEART RATE: 86 BPM | DIASTOLIC BLOOD PRESSURE: 68 MMHG | HEIGHT: 72 IN | BODY MASS INDEX: 35.21 KG/M2 | SYSTOLIC BLOOD PRESSURE: 118 MMHG | OXYGEN SATURATION: 96 %

## 2024-09-13 DIAGNOSIS — M54.50 CHRONIC MIDLINE LOW BACK PAIN WITHOUT SCIATICA: Primary | ICD-10-CM

## 2024-09-13 DIAGNOSIS — F41.1 GENERALIZED ANXIETY DISORDER: ICD-10-CM

## 2024-09-13 DIAGNOSIS — F51.01 PRIMARY INSOMNIA: ICD-10-CM

## 2024-09-13 DIAGNOSIS — G89.29 CHRONIC MIDLINE LOW BACK PAIN WITHOUT SCIATICA: Primary | ICD-10-CM

## 2024-09-13 PROCEDURE — 3008F BODY MASS INDEX DOCD: CPT | Performed by: FAMILY MEDICINE

## 2024-09-13 PROCEDURE — 99214 OFFICE O/P EST MOD 30 MIN: CPT | Performed by: FAMILY MEDICINE

## 2024-09-13 RX ORDER — MELOXICAM 15 MG/1
15 TABLET ORAL DAILY
Qty: 30 TABLET | Refills: 11 | Status: SHIPPED | OUTPATIENT
Start: 2024-09-13 | End: 2025-09-13

## 2024-09-13 ASSESSMENT — ENCOUNTER SYMPTOMS
COUGH: 0
SHORTNESS OF BREATH: 0
CONSTIPATION: 0
FATIGUE: 0
HEADACHES: 0
CHEST TIGHTNESS: 0
ABDOMINAL PAIN: 0
DIARRHEA: 0
PALPITATIONS: 0

## 2024-09-13 NOTE — PROGRESS NOTES
Subjective   Patient ID: Link Emanuel is a 39 y.o. male who presents for Back and knee Pain.    HPI   Son was diagnosed with a rare degenerative brain disorder that will be terminal.  He is obviously having a lot of troubles with anxiety and sleep.  On 40 of Prozac and 200 mg of long-acting Seroquel.  Will refer to  psychiatry.    Still has some aches and pains, we will have him use meloxicam as needed.      Review of Systems   Constitutional:  Negative for fatigue.   Eyes:  Negative for visual disturbance.   Respiratory:  Negative for cough, chest tightness and shortness of breath.    Cardiovascular:  Negative for chest pain and palpitations.   Gastrointestinal:  Negative for abdominal pain, constipation and diarrhea.   Skin:  Negative for rash.   Neurological:  Negative for headaches.       Objective   /68   Pulse 86   Ht 1.829 m (6')   Wt 118 kg (260 lb)   SpO2 96%   BMI 35.26 kg/m²     Physical Exam  Constitutional:       Appearance: Normal appearance.   Skin:     General: Skin is warm and dry.   Neurological:      General: No focal deficit present.      Mental Status: He is alert and oriented to person, place, and time.   Psychiatric:         Mood and Affect: Mood normal.         Behavior: Behavior normal.         Judgment: Judgment normal.         Assessment/Plan   Problem List Items Addressed This Visit             ICD-10-CM    Generalized anxiety disorder F41.1    Relevant Orders    Referral to Psychiatry    Primary insomnia F51.01    Relevant Orders    Referral to Psychiatry     Other Visit Diagnoses         Codes    Chronic midline low back pain without sciatica    -  Primary M54.50, G89.29    Relevant Medications    meloxicam (Mobic) 15 mg tablet

## 2024-10-15 DIAGNOSIS — F41.1 GENERALIZED ANXIETY DISORDER: ICD-10-CM

## 2024-10-15 DIAGNOSIS — F51.01 PRIMARY INSOMNIA: ICD-10-CM

## 2024-10-15 RX ORDER — QUETIAPINE 200 MG/1
200 TABLET, FILM COATED, EXTENDED RELEASE ORAL NIGHTLY
Qty: 30 TABLET | Refills: 1 | Status: SHIPPED | OUTPATIENT
Start: 2024-10-15 | End: 2024-12-14

## 2024-11-07 ENCOUNTER — OFFICE VISIT (OUTPATIENT)
Age: 39
End: 2024-11-07
Payer: COMMERCIAL

## 2024-11-07 VITALS
OXYGEN SATURATION: 95 % | DIASTOLIC BLOOD PRESSURE: 84 MMHG | HEART RATE: 76 BPM | HEIGHT: 72 IN | BODY MASS INDEX: 35.51 KG/M2 | SYSTOLIC BLOOD PRESSURE: 116 MMHG | WEIGHT: 262.2 LBS

## 2024-11-07 DIAGNOSIS — M25.562 ACUTE PAIN OF LEFT KNEE: Primary | ICD-10-CM

## 2024-11-07 PROCEDURE — 3008F BODY MASS INDEX DOCD: CPT | Performed by: FAMILY MEDICINE

## 2024-11-07 PROCEDURE — 99213 OFFICE O/P EST LOW 20 MIN: CPT | Performed by: FAMILY MEDICINE

## 2024-11-07 RX ORDER — MELOXICAM 15 MG/1
15 TABLET ORAL DAILY
Qty: 30 TABLET | Refills: 11 | Status: SHIPPED | OUTPATIENT
Start: 2024-11-07 | End: 2025-11-07

## 2024-11-07 RX ORDER — ACETAMINOPHEN 500 MG
500 TABLET ORAL EVERY 6 HOURS PRN
COMMUNITY

## 2024-11-07 ASSESSMENT — ENCOUNTER SYMPTOMS
ARTHRALGIAS: 1
WOUND: 0

## 2024-11-07 ASSESSMENT — PATIENT HEALTH QUESTIONNAIRE - PHQ9
SUM OF ALL RESPONSES TO PHQ9 QUESTIONS 1 & 2: 0
2. FEELING DOWN, DEPRESSED OR HOPELESS: NOT AT ALL
1. LITTLE INTEREST OR PLEASURE IN DOING THINGS: NOT AT ALL

## 2024-11-07 NOTE — PROGRESS NOTES
Subjective   Patient ID: Link Emanuel is a 39 y.o. male who presents for left knee pain (X several weeks. Getting worse).    HPI   Fell on his left knee the upper part has been having some knee Pain for the last couple weeks.  Looks like he sort of fell on a piece of metal and hit the soft tissue right above his left kneecap.    On exam there is no tenderness to palpation to the bones of the knee Of the left knee.  He does have some significant tenderness to the soft tissue above the left kneecap.    Ice as needed  Restart meloxicam take it daily for the next 7 to 10 days.  Take it with food.  If he still having significant troubles in a month, call and consider an x-ray.    Review of Systems   Musculoskeletal:  Positive for arthralgias.   Skin:  Negative for rash and wound.       Objective   /84   Pulse 76   Ht 1.829 m (6')   Wt 119 kg (262 lb 3.2 oz)   SpO2 95%   BMI 35.56 kg/m²     Physical Exam  Constitutional:       Appearance: Normal appearance.   Skin:     General: Skin is warm and dry.   Neurological:      General: No focal deficit present.      Mental Status: He is alert and oriented to person, place, and time.   Psychiatric:         Mood and Affect: Mood normal.         Behavior: Behavior normal.         Judgment: Judgment normal.         Assessment/Plan   Problem List Items Addressed This Visit    None  Visit Diagnoses         Codes    Acute pain of left knee    -  Primary M25.562    Relevant Medications    meloxicam (Mobic) 15 mg tablet

## 2024-12-02 DIAGNOSIS — M25.562 ACUTE PAIN OF LEFT KNEE: ICD-10-CM

## 2024-12-02 DIAGNOSIS — M54.50 ACUTE MIDLINE LOW BACK PAIN WITHOUT SCIATICA: ICD-10-CM

## 2024-12-02 RX ORDER — FLUOXETINE HYDROCHLORIDE 40 MG/1
40 CAPSULE ORAL DAILY
Qty: 30 CAPSULE | Refills: 11 | Status: SHIPPED | OUTPATIENT
Start: 2024-12-02 | End: 2024-12-04 | Stop reason: SDUPTHER

## 2024-12-04 DIAGNOSIS — M54.50 ACUTE MIDLINE LOW BACK PAIN WITHOUT SCIATICA: ICD-10-CM

## 2024-12-04 DIAGNOSIS — F51.01 PRIMARY INSOMNIA: ICD-10-CM

## 2024-12-04 DIAGNOSIS — M25.562 ACUTE PAIN OF LEFT KNEE: ICD-10-CM

## 2024-12-04 DIAGNOSIS — F41.1 GENERALIZED ANXIETY DISORDER: ICD-10-CM

## 2024-12-04 RX ORDER — QUETIAPINE 200 MG/1
200 TABLET, FILM COATED, EXTENDED RELEASE ORAL NIGHTLY
Qty: 30 TABLET | Refills: 11 | Status: SHIPPED | OUTPATIENT
Start: 2024-12-04 | End: 2025-12-04

## 2024-12-04 RX ORDER — MELOXICAM 15 MG/1
15 TABLET ORAL DAILY
Qty: 30 TABLET | Refills: 11 | Status: SHIPPED | OUTPATIENT
Start: 2024-12-04 | End: 2025-12-04

## 2024-12-04 RX ORDER — FLUOXETINE HYDROCHLORIDE 40 MG/1
40 CAPSULE ORAL DAILY
Qty: 30 CAPSULE | Refills: 11 | Status: SHIPPED | OUTPATIENT
Start: 2024-12-04 | End: 2025-12-04

## 2024-12-20 ENCOUNTER — APPOINTMENT (OUTPATIENT)
Dept: RADIOLOGY | Facility: HOSPITAL | Age: 39
End: 2024-12-20
Payer: COMMERCIAL

## 2024-12-20 ENCOUNTER — HOSPITAL ENCOUNTER (EMERGENCY)
Facility: HOSPITAL | Age: 39
Discharge: HOME | End: 2024-12-20
Payer: COMMERCIAL

## 2024-12-20 VITALS
OXYGEN SATURATION: 96 % | SYSTOLIC BLOOD PRESSURE: 148 MMHG | HEART RATE: 83 BPM | RESPIRATION RATE: 18 BRPM | TEMPERATURE: 97.6 F | DIASTOLIC BLOOD PRESSURE: 90 MMHG | WEIGHT: 260 LBS | BODY MASS INDEX: 35.21 KG/M2 | HEIGHT: 72 IN

## 2024-12-20 DIAGNOSIS — M25.462 EFFUSION OF LEFT KNEE: ICD-10-CM

## 2024-12-20 DIAGNOSIS — G89.29 CHRONIC PAIN OF LEFT KNEE: Primary | ICD-10-CM

## 2024-12-20 DIAGNOSIS — M25.562 CHRONIC PAIN OF LEFT KNEE: Primary | ICD-10-CM

## 2024-12-20 PROCEDURE — 99283 EMERGENCY DEPT VISIT LOW MDM: CPT

## 2024-12-20 PROCEDURE — 73564 X-RAY EXAM KNEE 4 OR MORE: CPT | Mod: LEFT SIDE | Performed by: RADIOLOGY

## 2024-12-20 PROCEDURE — 73564 X-RAY EXAM KNEE 4 OR MORE: CPT | Mod: LT

## 2024-12-20 PROCEDURE — 2500000001 HC RX 250 WO HCPCS SELF ADMINISTERED DRUGS (ALT 637 FOR MEDICARE OP): Performed by: PHYSICIAN ASSISTANT

## 2024-12-20 RX ORDER — DICLOFENAC SODIUM 10 MG/G
4 GEL TOPICAL 4 TIMES DAILY PRN
Qty: 100 G | Refills: 0 | Status: SHIPPED | OUTPATIENT
Start: 2024-12-20 | End: 2024-12-30

## 2024-12-20 RX ORDER — TRAMADOL HYDROCHLORIDE 50 MG/1
50 TABLET ORAL ONCE
Status: COMPLETED | OUTPATIENT
Start: 2024-12-20 | End: 2024-12-20

## 2024-12-20 ASSESSMENT — ENCOUNTER SYMPTOMS
HEMATURIA: 0
PALPITATIONS: 0
FEVER: 0
COLOR CHANGE: 0
ABDOMINAL PAIN: 0
CHILLS: 0
SEIZURES: 0
SHORTNESS OF BREATH: 0
BACK PAIN: 0
DYSURIA: 0
SORE THROAT: 0
VOMITING: 0
ARTHRALGIAS: 0
COUGH: 0
EYE PAIN: 0

## 2024-12-20 ASSESSMENT — PAIN DESCRIPTION - LOCATION: LOCATION: KNEE

## 2024-12-20 ASSESSMENT — COLUMBIA-SUICIDE SEVERITY RATING SCALE - C-SSRS
2. HAVE YOU ACTUALLY HAD ANY THOUGHTS OF KILLING YOURSELF?: NO
1. IN THE PAST MONTH, HAVE YOU WISHED YOU WERE DEAD OR WISHED YOU COULD GO TO SLEEP AND NOT WAKE UP?: NO
6. HAVE YOU EVER DONE ANYTHING, STARTED TO DO ANYTHING, OR PREPARED TO DO ANYTHING TO END YOUR LIFE?: NO

## 2024-12-20 ASSESSMENT — PAIN SCALES - GENERAL
PAINLEVEL_OUTOF10: 7
PAINLEVEL_OUTOF10: 7

## 2024-12-20 ASSESSMENT — PAIN DESCRIPTION - ORIENTATION: ORIENTATION: LEFT;ANTERIOR

## 2024-12-20 ASSESSMENT — PAIN - FUNCTIONAL ASSESSMENT: PAIN_FUNCTIONAL_ASSESSMENT: 0-10

## 2024-12-20 NOTE — Clinical Note
Link Emanuel was seen and treated in our emergency department on 12/20/2024.  He may return to work on 12/21/2024.       If you have any questions or concerns, please don't hesitate to call.      Shalini Polanco PA-C

## 2024-12-20 NOTE — ED PROVIDER NOTES
Patient is a 39-year-old male who presents to the urgency room with a chief complaint of left knee pain.  Patient states that he has had knee pain, started approximately 1 month ago and his family physician placed him on meloxicam.  He denies any known injury.  Patient states that his knee pain became worse yesterday evening.  He states that he does work a job where he is constantly on his feet.  He reports pain mainly with flexion and extension.  He does report full range of motion.  He denies any swelling, bruising, or redness.  He did take his meloxicam this morning which provided minimal relief.           Review of Systems   Constitutional:  Negative for chills and fever.   HENT:  Negative for ear pain and sore throat.    Eyes:  Negative for pain and visual disturbance.   Respiratory:  Negative for cough and shortness of breath.    Cardiovascular:  Negative for chest pain and palpitations.   Gastrointestinal:  Negative for abdominal pain and vomiting.   Genitourinary:  Negative for dysuria and hematuria.   Musculoskeletal:  Negative for arthralgias and back pain.        Left knee pain   Skin:  Negative for color change and rash.   Neurological:  Negative for seizures and syncope.   All other systems reviewed and are negative.       Physical Exam  Vitals and nursing note reviewed.   Constitutional:       General: He is not in acute distress.     Appearance: Normal appearance. He is well-developed.   HENT:      Head: Normocephalic and atraumatic.   Eyes:      Conjunctiva/sclera: Conjunctivae normal.   Cardiovascular:      Rate and Rhythm: Normal rate and regular rhythm.      Heart sounds: No murmur heard.  Pulmonary:      Effort: Pulmonary effort is normal. No respiratory distress.      Breath sounds: Normal breath sounds. No stridor. No wheezing, rhonchi or rales.   Abdominal:      Palpations: Abdomen is soft.      Tenderness: There is no abdominal tenderness.   Musculoskeletal:         General: No swelling.       Cervical back: Neck supple.      Left knee: Bony tenderness present. No swelling, deformity, effusion, erythema, ecchymosis, lacerations or crepitus. Normal range of motion. Tenderness present. No LCL laxity, MCL laxity, ACL laxity or PCL laxity.Normal pulse.      Comments: Full ROM. Pain with flexion and extension. Distal pulses are strong and capillary refill is less than 2 seconds   Skin:     General: Skin is warm and dry.      Capillary Refill: Capillary refill takes less than 2 seconds.   Neurological:      Mental Status: He is alert.   Psychiatric:         Mood and Affect: Mood normal.          Labs Reviewed - No data to display     XR knee left 4+ views   Final Result   The small joint effusion otherwise negative             MACRO:   None        Signed by: Joseph Schoenberger 12/20/2024 12:41 PM   Dictation workstation:   [id number]           Procedures     Medical Decision Making  Patient is a 39-year-old male who presents to the emergency room with a chief complaint of flank pain that has been chronic.  No known injury.  No redness or swelling.  Full range of motion.  No concern for septic joint at this time.  He is afebrile nontoxic-appearing.  Neurovascularly intact.  X-ray of the left knee performed which shows a small joint effusion, otherwise no abnormality.  Recommend rest, ice and compression.  Recommend follow-up with orthopedics and return for any new or worsening symptoms.  Patient is currently scanned.  Voltaren gel will be prescribed as well.  Differential diagnosis includes but not limited to sprain, contusion, internal knee injury, fracture, dislocation, septic joint    ACE wrap applied by myself, neurovascularly intact.    Amount and/or Complexity of Data Reviewed  Radiology: ordered and independent interpretation performed. Decision-making details documented in ED Course.     Details: X-ray of the left knee per my interpretation shows no fracture or dislocation.    Risk  Prescription drug  management.         Diagnoses as of 12/20/24 1254   Chronic pain of left knee   Effusion of left knee                    Shalini Polanco PA-C  12/20/24 [id number]

## 2024-12-31 ENCOUNTER — APPOINTMENT (OUTPATIENT)
Dept: ORTHOPEDIC SURGERY | Facility: CLINIC | Age: 39
End: 2024-12-31
Payer: COMMERCIAL

## 2024-12-31 DIAGNOSIS — M25.562 ACUTE PAIN OF LEFT KNEE: ICD-10-CM

## 2025-02-12 ENCOUNTER — HOSPITAL ENCOUNTER (EMERGENCY)
Facility: HOSPITAL | Age: 40
Discharge: HOME | End: 2025-02-12
Payer: COMMERCIAL

## 2025-02-12 VITALS
SYSTOLIC BLOOD PRESSURE: 154 MMHG | TEMPERATURE: 98 F | HEIGHT: 72 IN | RESPIRATION RATE: 16 BRPM | HEART RATE: 69 BPM | OXYGEN SATURATION: 98 % | DIASTOLIC BLOOD PRESSURE: 96 MMHG | BODY MASS INDEX: 35.21 KG/M2 | WEIGHT: 260 LBS

## 2025-02-12 DIAGNOSIS — F32.A DEPRESSION, UNSPECIFIED DEPRESSION TYPE: Primary | ICD-10-CM

## 2025-02-12 LAB
ALBUMIN SERPL BCP-MCNC: 4.8 G/DL (ref 3.4–5)
ALP SERPL-CCNC: 57 U/L (ref 33–120)
ALT SERPL W P-5'-P-CCNC: 22 U/L (ref 10–52)
AMPHETAMINES UR QL SCN: ABNORMAL
ANION GAP SERPL CALC-SCNC: 11 MMOL/L (ref 10–20)
APAP SERPL-MCNC: <10 UG/ML
AST SERPL W P-5'-P-CCNC: 27 U/L (ref 9–39)
BARBITURATES UR QL SCN: ABNORMAL
BASOPHILS # BLD AUTO: 0.02 X10*3/UL (ref 0–0.1)
BASOPHILS NFR BLD AUTO: 0.2 %
BENZODIAZ UR QL SCN: ABNORMAL
BILIRUB SERPL-MCNC: 0.5 MG/DL (ref 0–1.2)
BUN SERPL-MCNC: 12 MG/DL (ref 6–23)
BZE UR QL SCN: ABNORMAL
CALCIUM SERPL-MCNC: 9.5 MG/DL (ref 8.6–10.3)
CANNABINOIDS UR QL SCN: ABNORMAL
CHLORIDE SERPL-SCNC: 106 MMOL/L (ref 98–107)
CO2 SERPL-SCNC: 27 MMOL/L (ref 21–32)
CREAT SERPL-MCNC: 0.81 MG/DL (ref 0.5–1.3)
EGFRCR SERPLBLD CKD-EPI 2021: >90 ML/MIN/1.73M*2
EOSINOPHIL # BLD AUTO: 0 X10*3/UL (ref 0–0.7)
EOSINOPHIL NFR BLD AUTO: 0 %
ERYTHROCYTE [DISTWIDTH] IN BLOOD BY AUTOMATED COUNT: 12.5 % (ref 11.5–14.5)
ETHANOL SERPL-MCNC: <10 MG/DL
FENTANYL+NORFENTANYL UR QL SCN: ABNORMAL
GLUCOSE SERPL-MCNC: 92 MG/DL (ref 74–99)
HCT VFR BLD AUTO: 43.3 % (ref 41–52)
HGB BLD-MCNC: 14.6 G/DL (ref 13.5–17.5)
IMM GRANULOCYTES # BLD AUTO: 0.03 X10*3/UL (ref 0–0.7)
IMM GRANULOCYTES NFR BLD AUTO: 0.3 % (ref 0–0.9)
LYMPHOCYTES # BLD AUTO: 0.95 X10*3/UL (ref 1.2–4.8)
LYMPHOCYTES NFR BLD AUTO: 9.3 %
MCH RBC QN AUTO: 29.3 PG (ref 26–34)
MCHC RBC AUTO-ENTMCNC: 33.7 G/DL (ref 32–36)
MCV RBC AUTO: 87 FL (ref 80–100)
METHADONE UR QL SCN: ABNORMAL
MONOCYTES # BLD AUTO: 0.63 X10*3/UL (ref 0.1–1)
MONOCYTES NFR BLD AUTO: 6.2 %
NEUTROPHILS # BLD AUTO: 8.57 X10*3/UL (ref 1.2–7.7)
NEUTROPHILS NFR BLD AUTO: 84 %
NRBC BLD-RTO: 0 /100 WBCS (ref 0–0)
OPIATES UR QL SCN: ABNORMAL
OXYCODONE+OXYMORPHONE UR QL SCN: ABNORMAL
PCP UR QL SCN: ABNORMAL
PLATELET # BLD AUTO: 242 X10*3/UL (ref 150–450)
POTASSIUM SERPL-SCNC: 4.1 MMOL/L (ref 3.5–5.3)
PROT SERPL-MCNC: 7.8 G/DL (ref 6.4–8.2)
RBC # BLD AUTO: 4.98 X10*6/UL (ref 4.5–5.9)
SALICYLATES SERPL-MCNC: <3 MG/DL
SODIUM SERPL-SCNC: 140 MMOL/L (ref 136–145)
WBC # BLD AUTO: 10.2 X10*3/UL (ref 4.4–11.3)

## 2025-02-12 PROCEDURE — 80320 DRUG SCREEN QUANTALCOHOLS: CPT | Performed by: NURSE PRACTITIONER

## 2025-02-12 PROCEDURE — 85025 COMPLETE CBC W/AUTO DIFF WBC: CPT | Performed by: NURSE PRACTITIONER

## 2025-02-12 PROCEDURE — 80053 COMPREHEN METABOLIC PANEL: CPT | Performed by: NURSE PRACTITIONER

## 2025-02-12 PROCEDURE — 36415 COLL VENOUS BLD VENIPUNCTURE: CPT | Performed by: NURSE PRACTITIONER

## 2025-02-12 PROCEDURE — 80307 DRUG TEST PRSMV CHEM ANLYZR: CPT | Performed by: NURSE PRACTITIONER

## 2025-02-12 PROCEDURE — 99283 EMERGENCY DEPT VISIT LOW MDM: CPT

## 2025-02-12 SDOH — HEALTH STABILITY: MENTAL HEALTH: FOR HIGH RISK PATIENTS: 1:1 PATIENT OBSERVER AT ALL TIMES;ALL INTERVENTIONS ABOVE, PLUS:

## 2025-02-12 SDOH — HEALTH STABILITY: MENTAL HEALTH: SUICIDE ASSESSMENT: ADULT (C-SSRS)

## 2025-02-12 SDOH — HEALTH STABILITY: MENTAL HEALTH: BEHAVIORAL HEALTH(WDL): EXCEPTIONS TO WDL

## 2025-02-12 SDOH — HEALTH STABILITY: MENTAL HEALTH: HAVE YOU WISHED YOU WERE DEAD OR WISHED YOU COULD GO TO SLEEP AND NOT WAKE UP?: YES

## 2025-02-12 SDOH — HEALTH STABILITY: MENTAL HEALTH: NEEDS EXPRESSED: EMOTIONAL

## 2025-02-12 SDOH — SOCIAL STABILITY: SOCIAL NETWORK: EMOTIONAL SUPPORT GIVEN: REASSURE

## 2025-02-12 SDOH — HEALTH STABILITY: MENTAL HEALTH: BEHAVIORS/MOOD: APPROPRIATE FOR SITUATION;CRYING;COOPERATIVE;RESTLESS

## 2025-02-12 SDOH — HEALTH STABILITY: MENTAL HEALTH: BEHAVIORS/MOOD: APPROPRIATE FOR SITUATION;COOPERATIVE;RESTLESS

## 2025-02-12 SDOH — HEALTH STABILITY: MENTAL HEALTH
OTHER SUICIDE PRECAUTIONS INCLUDE: PATIENT PLACED IN AN EASILY OBSERVABLE ROOM WITH DOOR/CURTAIN REMAINING OPEN;PATIENT PLACED IN GOWN (SNAPS OR PAPER GOWNS PREFERRED) AND WANDED;REMAINING RISKS IDENTIFIED AND MITIGATED;PATIENT PLACED IN PSYCH SAFE ROOM (IF AVAILABLE);ELOPEMENT RISK IDENTIFIED

## 2025-02-12 SDOH — HEALTH STABILITY: MENTAL HEALTH: HAVE YOU BEEN THINKING ABOUT HOW YOU MIGHT DO THIS?: YES

## 2025-02-12 SDOH — HEALTH STABILITY: MENTAL HEALTH: NEEDS EXPRESSED: DENIES

## 2025-02-12 SDOH — HEALTH STABILITY: MENTAL HEALTH

## 2025-02-12 SDOH — HEALTH STABILITY: MENTAL HEALTH: CONTENT: BLAMING SELF;PREOCCUPATION

## 2025-02-12 SDOH — HEALTH STABILITY: MENTAL HEALTH: HAVE YOU ACTUALLY HAD ANY THOUGHTS OF KILLING YOURSELF?: YES

## 2025-02-12 SDOH — HEALTH STABILITY: MENTAL HEALTH: BEHAVIORS/MOOD: CRYING

## 2025-02-12 SDOH — HEALTH STABILITY: MENTAL HEALTH: SLEEP PATTERN: DISTURBED/INTERRUPTED SLEEP

## 2025-02-12 SDOH — HEALTH STABILITY: MENTAL HEALTH: WAS THIS WITHIN THE PAST THREE MONTHS?: NO

## 2025-02-12 SDOH — HEALTH STABILITY: MENTAL HEALTH: HAVE YOU EVER DONE ANYTHING, STARTED TO DO ANYTHING, OR PREPARED TO DO ANYTHING TO END YOUR LIFE?: YES

## 2025-02-12 SDOH — HEALTH STABILITY: MENTAL HEALTH
HAVE YOU STARTED TO WORK OUT OR WORKED OUT THE DETAILS OF HOW TO KILL YOURSELF? DO YOU INTENT TO CARRY OUT THIS PLAN?: NO

## 2025-02-12 SDOH — HEALTH STABILITY: MENTAL HEALTH: CONTENT: BLAMING SELF

## 2025-02-12 SDOH — HEALTH STABILITY: MENTAL HEALTH: HAVE YOU HAD THESE THOUGHTS AND HAD SOME INTENTION OF ACTING ON THEM?: YES

## 2025-02-12 ASSESSMENT — COLUMBIA-SUICIDE SEVERITY RATING SCALE - C-SSRS
6. HAVE YOU EVER DONE ANYTHING, STARTED TO DO ANYTHING, OR PREPARED TO DO ANYTHING TO END YOUR LIFE?: NO
1. IN THE PAST MONTH, HAVE YOU WISHED YOU WERE DEAD OR WISHED YOU COULD GO TO SLEEP AND NOT WAKE UP?: YES
5. HAVE YOU STARTED TO WORK OUT OR WORKED OUT THE DETAILS OF HOW TO KILL YOURSELF? DO YOU INTEND TO CARRY OUT THIS PLAN?: NO
4. HAVE YOU HAD THESE THOUGHTS AND HAD SOME INTENTION OF ACTING ON THEM?: YES
2. HAVE YOU ACTUALLY HAD ANY THOUGHTS OF KILLING YOURSELF?: YES
6. HAVE YOU EVER DONE ANYTHING, STARTED TO DO ANYTHING, OR PREPARED TO DO ANYTHING TO END YOUR LIFE?: YES

## 2025-02-12 ASSESSMENT — PAIN SCALES - GENERAL
PAINLEVEL_OUTOF10: 0 - NO PAIN
PAINLEVEL_OUTOF10: 0 - NO PAIN

## 2025-02-12 ASSESSMENT — PAIN - FUNCTIONAL ASSESSMENT: PAIN_FUNCTIONAL_ASSESSMENT: 0-10

## 2025-02-12 NOTE — ED PROVIDER NOTES
Chief Complaint   Patient presents with    Suicidal     Pt states he left a note for his ex girlfriend today and sent her text about not deserving to live, pt states he was having thoughts of suicide today and was planning to take all his pills. He denies wanting to do that now.  He also states he has been lying and stealing from his ex girlfriend.  Pt brought to room 16, wanded, 1:1 observation completed on arrival        Patient History    Past Medical History:   Diagnosis Date    PTSD (post-traumatic stress disorder)       Past Surgical History:   Procedure Laterality Date    NO PAST SURGERIES        Family History   Problem Relation Name Age of Onset    Heart attack Mother          Passed away at 36.      Social History     Social History Narrative    Not on file      No Known Allergies     PMH: Reviewed  PSH: Reviewed  Social History: Reviewed.   Allergies reviewed.     HPI: Link Emanuel is a 39 y.o. male who presents to the ED today accompanied by [employer] with complaints of suicidal thoughts.  Patient states that he had a plan for suicide by taking all of his pills today, hoping he would fall asleep and never wake up.  States that he does not feel that he deserves to live after he has been hurting the ones that he loves.  Tearful on ED arrival.   has had intermittent thoughts of suicide before.  Has been hospitalized in the past.  Used to see [facility] for psychiatry,  has not been there in a while and is not an active patient currently.      REVIEW OF SYSTEMS:  All other systems reviewed and negative except as listed in HPI.    PHYSICAL EXAM:    GENERAL: Vitals noted, no distress. Alert and oriented x 3. Non-toxic.      EENT: TMs clear. Posterior oropharynx unremarkable. EOMI, no nystagmus noted.     NECK: Supple. No masses. No midline tenderness. No meningeal signs.     CARDIAC: Regular rate, rhythm. No murmurs rubs or gallops. No JVD.    PULMONARY: Lungs clear and equal bilaterally. No wheezes rales  or rhonchi. No respiratory distress.     ABDOMEN: Soft, nondistended, and nontender. No peritoneal signs. Bowel sounds are present and normoactive in all 4 quadrants. No pulsatile masses.     EXTREMITIES: No peripheral edema.     SKIN: No rash. Warm, dry, and intact.     NEURO: No focal neurologic deficits.     Labs Reviewed   CBC WITH AUTO DIFFERENTIAL - Abnormal       Result Value    WBC 10.2      nRBC 0.0      RBC 4.98      Hemoglobin 14.6      Hematocrit 43.3      MCV 87      MCH 29.3      MCHC 33.7      RDW 12.5      Platelets 242      Neutrophils % 84.0      Immature Granulocytes %, Automated 0.3      Lymphocytes % 9.3      Monocytes % 6.2      Eosinophils % 0.0      Basophils % 0.2      Neutrophils Absolute 8.57 (*)     Immature Granulocytes Absolute, Automated 0.03      Lymphocytes Absolute 0.95 (*)     Monocytes Absolute 0.63      Eosinophils Absolute 0.00      Basophils Absolute 0.02     DRUG SCREEN,URINE - Abnormal    Amphetamine Screen, Urine Presumptive Negative      Barbiturate Screen, Urine Presumptive Negative      Benzodiazepines Screen, Urine Presumptive Negative      Cannabinoid Screen, Urine Presumptive Positive (*)     Cocaine Metabolite Screen, Urine Presumptive Negative      Fentanyl Screen, Urine Presumptive Negative      Opiate Screen, Urine Presumptive Negative      Oxycodone Screen, Urine Presumptive Negative      PCP Screen, Urine Presumptive Negative      Methadone Screen, Urine Presumptive Negative      Narrative:     Drug screen results are presumptive and should not be used to assess   compliance with prescribed medication. Contact the performing [facility] laboratory   to add-on definitive confirmatory testing if clinically indicated.    Toxicology screening results are reported qualitatively. The concentration must   be greater than or equal to the cutoff to be reported as positive. The concentration   at which the screening test can detect an individual drug or metabolite varies.   The  absence of expected drug(s) and/or drug metabolite(s) may indicate non-compliance,   inappropriate timing of specimen collection relative to drug administration, poor drug   absorption, diluted/adulterated urine, or limitations of testing. For medical purposes   only; not valid for forensic use.    Interpretive questions should be directed to the laboratory medical directors.   COMPREHENSIVE METABOLIC PANEL - Normal    Glucose 92      Sodium 140      Potassium 4.1      Chloride 106      Bicarbonate 27      Anion Gap 11      Urea Nitrogen 12      Creatinine 0.81      eGFR >90      Calcium 9.5      Albumin 4.8      Alkaline Phosphatase 57      Total Protein 7.8      AST 27      Bilirubin, Total 0.5      ALT 22     ACUTE TOXICOLOGY PANEL, BLOOD - Normal    Acetaminophen <10.0      Salicylate  <3      Alcohol <10          No orders to display        Medical Decision Making         ED COURSE: This patient was seen and examined by myself and Dr. Pierce Strickland to the bedside.  Patient placed under suicide precautions.  [place] slipped by [employer].  Will medically clear and have patient evaluated by Reina Frye.    Drug screen positive for marijuana, otherwise workup negative. [facility] called for screening exam. Em remains at bedside. Patient quiet and cooperative.     [facility] screened the patient and was able to make a safety plan with him. He has a friend who will stay with him tonight and he is scheduled for an appt at [email or web address] on Friday Feb 14th at 10am. Able to return to the ED for any new or worsening symptoms.         Differential Diagnoses Considered: SI    Chronic Medical Conditions Significantly Affecting Care: see above    External Records Reviewed: I reviewed recent and relevant outside records including: PCP notes, prior discharge summary, previous radiologic studies    Diagnostic testing considered: blood, urine    Escalation of Care: Appropriate for [outpatient management, considered observation/admission,   evaluated by consultant and discharge recommended        DIAGNOSTIC IMPRESSION: #1 depression     Shalini Reddy, APRN-CNP  02/12/25 1934

## 2025-03-21 ENCOUNTER — TELEPHONE (OUTPATIENT)
Age: 40
End: 2025-03-21
Payer: COMMERCIAL